# Patient Record
Sex: FEMALE | Race: WHITE
[De-identification: names, ages, dates, MRNs, and addresses within clinical notes are randomized per-mention and may not be internally consistent; named-entity substitution may affect disease eponyms.]

---

## 2021-04-06 ENCOUNTER — HOSPITAL ENCOUNTER (OUTPATIENT)
Dept: HOSPITAL 35 - LAB | Age: 66
End: 2021-04-06
Attending: INTERNAL MEDICINE
Payer: COMMERCIAL

## 2021-04-06 ENCOUNTER — HOSPITAL ENCOUNTER (OUTPATIENT)
Dept: HOSPITAL 35 - CAT | Age: 66
End: 2021-04-06
Attending: INTERNAL MEDICINE
Payer: COMMERCIAL

## 2021-04-06 DIAGNOSIS — Z01.812: Primary | ICD-10-CM

## 2021-04-06 DIAGNOSIS — J84.10: Primary | ICD-10-CM

## 2021-04-06 DIAGNOSIS — R91.8: ICD-10-CM

## 2021-04-06 DIAGNOSIS — J98.4: ICD-10-CM

## 2021-04-06 DIAGNOSIS — K76.0: ICD-10-CM

## 2021-04-06 DIAGNOSIS — Z20.822: ICD-10-CM

## 2021-04-08 ENCOUNTER — HOSPITAL ENCOUNTER (OUTPATIENT)
Dept: HOSPITAL 35 - PUL | Age: 66
Discharge: HOME | End: 2021-04-08
Attending: INTERNAL MEDICINE
Payer: COMMERCIAL

## 2021-04-08 VITALS — BODY MASS INDEX: 24.35 KG/M2 | HEIGHT: 61 IN | WEIGHT: 129 LBS

## 2021-04-08 VITALS — SYSTOLIC BLOOD PRESSURE: 144 MMHG | DIASTOLIC BLOOD PRESSURE: 59 MMHG

## 2021-04-08 DIAGNOSIS — Z98.890: ICD-10-CM

## 2021-04-08 DIAGNOSIS — J98.4: Primary | ICD-10-CM

## 2021-04-08 DIAGNOSIS — Z98.51: ICD-10-CM

## 2021-04-08 DIAGNOSIS — E78.00: ICD-10-CM

## 2021-04-08 DIAGNOSIS — Z88.2: ICD-10-CM

## 2021-04-08 DIAGNOSIS — I10: ICD-10-CM

## 2021-04-08 DIAGNOSIS — Z87.442: ICD-10-CM

## 2021-04-08 DIAGNOSIS — K21.9: ICD-10-CM

## 2021-04-08 DIAGNOSIS — Z88.8: ICD-10-CM

## 2021-04-08 DIAGNOSIS — Z79.899: ICD-10-CM

## 2021-04-08 DIAGNOSIS — Z96.643: ICD-10-CM

## 2021-04-08 PROCEDURE — 62900: CPT

## 2021-04-08 PROCEDURE — 62110: CPT

## 2021-04-08 PROCEDURE — 70005: CPT

## 2021-04-08 NOTE — EKG
[address]  [address]
Phone:  [phone]                    ELECTROCARDIOGRAM REPORT      
_______________________________________________________________________________
 
Name:       LUZMARIA SALAZAR              Room #:         150-7       REG CLI 
M.R.#:      [id number]     Account #:      [id number]  
Admission:  21    Attend Phys:    Raghav Barnhart MD   
Discharge:              Date of Birth:  [birth year]  
                                                          Report #: [id number]
   [id number]
_______________________________________________________________________________
                          [facility]
                                       
Test Date:    2021               Test Time:    09:33:02
Pat Name:     LUZMARIA SALAZAR         Department:   
Patient ID:   [id number]            Room:          
Gender:       F                        Technician:   WILFRIDO
:          1955               Requested By: Raghav Barnhart
Order Number: [id number] MD:   Jorge Claire
                                 Measurements
Intervals                              Axis          
Rate:         65                       P:            44
IN:           172                      QRS:          -5
QRSD:         97                       T:            30
QT:           408                                    
QTc:          425                                    
                           Interpretive Statements
Sinus rhythm
No previous ECG available for comparison
Electronically Signed On 2021 15:30:12 CDT by Jorge Claire
[email or web address]
 
 
 
 
 
 
 
 
 
 
 
 
 
 
 
 
 
 
 
 
 
 
 
  <ELECTRONICALLY SIGNED>
   By: Jorge Claire MD, [facility]    
  21     1530
D: 21 0933                           _____________________________________
T: 21                           Jorge Claire MD, FACC      /EPI

## 2021-04-09 LAB
BF MACROPHAGE: 20 %
BF NEUTROPHILS: 75 %
BF NUCLEATED CELLS: 386 /MM3
BF RBC: 5329 /MM3
CLARITY UR: (no result)
COLOR UR: (no result)
SOURCE: (no result)
SPECIMEN VOL 24H UR: (no result) ML

## 2021-04-13 NOTE — PATH
[facility]
[address]
[place]   [address]                     PATHOLOGY RPT PROCEDURE       
_______________________________________________________________________________
 
Name:       LUZMARIA SALAZAR              Room #:                     DEP KATELYN 
M.R.#:      [id number]     Account #:      [id number]  
Admission:  04/08/21    Date of Birth:  [birth year]  
Discharge:  04/08/21                                    Report #:    [id number]
                                                        Path Case #: [id number]
_______________________________________________________________________________
 
LCA Accession Number: [id number]
.                                                                01
Material submitted:                                        .
PART A: lung - RUL TBNA BIOPSY #2. Modifiers: right, upper
PART B: lung - RUL TBNA BIOPSY SITE3. Modifiers: right, upper
.                                                                02
**********************************************************************
Diagnosis:
A.  Lung, right upper lobe, TBNA biopsy:
- Moderate chronic inflammation along with fibrotic changes, as well as
abundant macrophages, compatible with reactive process.
- Negative for malignancy.
- Reactive Pneumocyte hyperplasia present.
.
B.  Lung, right upper lobe, TBNA biopsy site 3:
- Moderate chronic inflammation along with fibrotic changes, as well as
abundant macrophages, compatible with reactive process.
- Negative for malignancy.
- Reactive Pneumocyte hyperplasia present.
.
(IUV:; 04/13/2021)
MBR  04/13/2021  1443 Local
**********************************************************************
.                                                                02
Comment:
Multiple properly controlled immunohistochemical stains are performed on
blocks A2 and B2.  The immunostains were performed to further characterize
the cells.  AE1/AE3, TTF-1, as well as Napsin are reactive within the type
2 Pneumocyte hyperplasia.  Definitive malignant epithelial cells are not
identified.  CD68 is reactive within the macrophages in the intra-alveolar
spaces.  Immunohistochemical staining pattern is similar in both blocks A2
as well as B2.
.
Dr. Sapna Pichardo and Dr. Gemini Land have seen representative
slides of this case and concur with my diagnosis.
.
(IUV:; 04/13/2021)
.                                                                02
Electronically signed:                                     .
Myrtle Duong MD, Pathologist
NPI- [id number]
.                                                                01
Gross description:                                         .
A.  Received in formalin labeled "Luzmaria Salazar RUL TBNA biopsy
forceps #2" are multiple fragments of tan-white soft tissue measuring in
aggregate 0.6 x 0.5 x 0.2 cm.  The specimen is submitted entirely in
cassettes A1-A2.
 
 
[place] [address]                     PATHOLOGY RPT PROCEDURE       
_______________________________________________________________________________
 
Name:       MARTINLUZMARIA              Room #:                     DEP [facility] 
CONOR#:      [id number]     Account #:      [id number]  
Admission:  04/08/21    Date of Birth:  [birth year]  
Discharge:  04/08/21                                    Report #:    [id number]
                                                        Path Case #: [id number]
_______________________________________________________________________________
.
B.  Received in formalin labeled "Luzmaria Salazar, TBNA biopsy site 3
forceps" are multiple fragments of gray-brown soft tissue measuring in
aggregate 0.5 x 0.4 x 0.1 cm.  The specimen is submitted entirely in
cassettes B1-B2. ([facility]; 4/11/2021)
[facility]  04/11/2021  0948 Local
.                                                                02
Pathologist provided ICD-10:
J98.4
.                                                                02
CPT                                                        .
441432, 508517, D12749, V67495
Specimen Comment: A courtesy copy of this report has been sent to [phone]
Specimen Comment: Report sent to 
***Performed at:  01
   [address], [place]  [id number]
   MD Jacinto Curtis MD Phone:  [phone]
***Performed at:  02
   [address]  [id number]
   MD Myrtle Duong MD Phone:  [phone]

## 2021-04-13 NOTE — PATH
[facility]
[address]
[place]   [address]                     PATHOLOGY RPT PROCEDURE       
_______________________________________________________________________________
 
Name:       LUZMARIA SALAZAR              Room #:                     DEP SANDRA ALCANTARA.#:      [id number]     Account #:      [id number]  
Admission:  04/08/21    Date of Birth:  [birth year]  
Discharge:  04/08/21                                    Report #:    [id number]
                                                        Path Case #: [id number]
_______________________________________________________________________________
Note
LCA Accession Number: [id number]
   TESTS               RESULT  FLAG  UNITS    REF RANGE  LAB
------------------------------------------------------------
   Clinician Provided Cytology Information
   No. of containers..01 Other (Miscellaneous)
Source:                                                   01
   RUL BAL
DIAGNOSIS:                                                02
   RUL BAL
   NEGATIVE FOR MALIGNANT EPITHELIAL CELLS.
   NORMAL BRONCHIAL CELLS AND MACROPHAGES ARE PRESENT.
   PULMONARY MACROPHAGES (DUST CELLS) ARE PRESENT.
Pathologist ICD10:                                        02
   R91.8
Signed out by:                                            02
   Myrtle Duong MD, Pathologist
   NPI- [id number]
Performed by:                                             Bridger Lutz, Cytotechnologist ([facility])
Gross description:                                        01
   10ML, RED, 1TP
   /LCS  04/10/2021  0515 Local
 
------------------------------------------------------------
    FLAG LEGEND:
    L-Low Normal,H-High Normal,LL-Alert Low,HH-Alert High
    <-Panic Low,>-Panic High,A-Abnormal,AA-Critical Abnormal
------------------------------------------------------------
 
Performed at:
[address]
   [place]  [address]
   Jacinto Curtis MD, Phone: [phone]
[address]  [address]
   Myrtle Duong MD, Phone: [phone]
Specimen Comment: A courtesy copy of this report has been sent to [phone]
[phone]
Specimen Comment: [phone]
Specimen Comment: Report sent to  / DR LECHUGA
***Performed at:  01
   [address], [place], [place]  [id number]
   MD Jacinto Curtis MD Phone:  [phone]

## 2021-04-13 NOTE — PATH
[facility]
[address]
[place]   [address]                     PATHOLOGY RPT PROCEDURE       
_______________________________________________________________________________
 
Name:       LUZMARIA SALAZAR              Room #:                     DEP SANDRA ALCANTARA.#:      [id number]     Account #:      [id number]  
Admission:  04/08/21    Date of Birth:  [birth year]  
Discharge:  04/08/21                                    Report #:    [id number]
                                                        Path Case #: [id number]
_______________________________________________________________________________
Note
LCA Accession Number: [id number]
   TESTS               RESULT  FLAG  UNITS    REF RANGE  LAB
------------------------------------------------------------
   Clinician Provided Cytology Information
   No. of containers..01 Other (Miscellaneous)
Source:                                                   01
   RUL BRUSHING
DIAGNOSIS:                                                02
   RUL BRUSHING
   NEGATIVE FOR MALIGNANT EPITHELIAL CELLS.
   NORMAL AND REACTIVE BRONCHIAL CELLS ARE PRESENT.
   RED BLOOD CELLS ARE PRESENT.
   Comment:
   Dr. Sapna Pichardo and Dr. Gemini Land have seen this case and
   concur with my diagnosis.
Pathologist ICD10:                                        02
   R91.8
Signed out by:                                            Kezia Duong MD, Pathologist
   NPI- [id number]
Performed by:                                             Bridger Lutz, Cytotechnologist ([facility])
Gross description:                                        01
   17ML, COLORLESS, 1TP
   /LCS  04/10/2021  0513 Local
 
------------------------------------------------------------
    FLAG LEGEND:
    L-Low Normal,H-High Normal,LL-Alert Low,HH-Alert High
    <-Panic Low,>-Panic High,A-Abnormal,AA-Critical Abnormal
------------------------------------------------------------
 
Performed at:
[address]
   [place]  [address]
   Jacinto Curtis MD, Phone: [phone]
[address]  [address]
   Myrtle Duong MD, Phone: [phone]
Specimen Comment: A courtesy copy of this report has been sent to [phone]
[phone]
Specimen Comment: [phone]
Specimen Comment: Report sent to  / DR LECHUGA
***Performed at:  01
   [address], [place], [place]  [id number]
 
 
[address]   [address]                     PATHOLOGY RPT PROCEDURE       
_______________________________________________________________________________
 
Name:       MARTINLUZMARIA              Room #:                     DEP SANDRA PERDOMO#:      [id number]     Account #:      [id number]  
Admission:  04/08/21    Date of Birth:  [birth year]  
Discharge:  04/08/21                                    Report #:    [id number]
                                                        Path Case #: [id number]
_______________________________________________________________________________
   MD Jacinto Curtis MD Phone:  [phone]